# Patient Record
Sex: FEMALE | Race: WHITE | NOT HISPANIC OR LATINO | Employment: UNEMPLOYED | URBAN - NONMETROPOLITAN AREA
[De-identification: names, ages, dates, MRNs, and addresses within clinical notes are randomized per-mention and may not be internally consistent; named-entity substitution may affect disease eponyms.]

---

## 2023-11-11 ENCOUNTER — HOSPITAL ENCOUNTER (EMERGENCY)
Facility: HOSPITAL | Age: 48
Discharge: HOME | End: 2023-11-11
Attending: EMERGENCY MEDICINE
Payer: COMMERCIAL

## 2023-11-11 VITALS
DIASTOLIC BLOOD PRESSURE: 72 MMHG | BODY MASS INDEX: 26.05 KG/M2 | RESPIRATION RATE: 19 BRPM | SYSTOLIC BLOOD PRESSURE: 141 MMHG | HEART RATE: 104 BPM | WEIGHT: 152.56 LBS | OXYGEN SATURATION: 100 % | TEMPERATURE: 97.1 F | HEIGHT: 64 IN

## 2023-11-11 DIAGNOSIS — S09.90XA INJURY OF HEAD, INITIAL ENCOUNTER: Primary | ICD-10-CM

## 2023-11-11 DIAGNOSIS — S01.01XA SCALP LACERATION, INITIAL ENCOUNTER: ICD-10-CM

## 2023-11-11 PROCEDURE — 12001 RPR S/N/AX/GEN/TRNK 2.5CM/<: CPT | Performed by: EMERGENCY MEDICINE

## 2023-11-11 PROCEDURE — 99284 EMERGENCY DEPT VISIT MOD MDM: CPT | Performed by: EMERGENCY MEDICINE

## 2023-11-11 PROCEDURE — 99283 EMERGENCY DEPT VISIT LOW MDM: CPT

## 2023-11-11 RX ORDER — GINSENG 100 MG
1 CAPSULE ORAL ONCE
Status: COMPLETED | OUTPATIENT
Start: 2023-11-11 | End: 2023-11-11

## 2023-11-11 RX ORDER — CETIRIZINE HYDROCHLORIDE 10 MG/1
10 TABLET ORAL DAILY
COMMUNITY

## 2023-11-11 RX ADMIN — BACITRACIN 1 SMALL APPLICATION: 500 OINTMENT TOPICAL at 17:20

## 2023-11-12 NOTE — ED PROVIDER NOTES
History  Chief Complaint   Patient presents with    Head Injury     Pt c/o left head pain w/nausea after stepping down off table into wash basket falling on side hitting head against handle of desk 1.5hrs ago. Denies loc/blood thinners     52-year-old female presents emergency room with chief complaint of a laceration to her occipital scalp. Patient lost her balance fell backwards and struck her head on furniture. Patient sustained no loss consciousness. She has no severe headache. She is not nauseated. She had no vomiting. She has no neck pain or unilateral weakness. No other injury. Patient is uncertain as to her last tetanus but review of the records indicate that the patient is up-to-date. History provided by:  Patient  Head Injury w/unknown LOC  Location:  Occipital  Time since incident: 90 minutes. Mechanism of injury: fall    Fall:     Fall occurred:  Tripped and walking    Impact surface:  Furniture  Associated symptoms: no blurred vision, no disorientation, no double vision, no focal weakness, no hearing loss, no loss of consciousness, no memory loss, no nausea, no neck pain and no vomiting        Prior to Admission Medications   Prescriptions Last Dose Informant Patient Reported? Taking? cetirizine (ZyrTEC) 10 mg tablet 11/10/2023  Yes Yes   Sig: Take 10 mg by mouth daily      Facility-Administered Medications: None       History reviewed. No pertinent past medical history. History reviewed. No pertinent surgical history. History reviewed. No pertinent family history. I have reviewed and agree with the history as documented. E-Cigarette/Vaping    E-Cigarette Use Never User      E-Cigarette/Vaping Substances     Social History     Tobacco Use    Smoking status: Never    Smokeless tobacco: Never   Vaping Use    Vaping Use: Never used   Substance Use Topics    Alcohol use: Not Currently    Drug use: Never       Review of Systems   HENT:  Negative for hearing loss.     Eyes:  Negative for blurred vision and double vision. Gastrointestinal:  Negative for nausea and vomiting. Musculoskeletal:  Negative for neck pain. Skin:  Positive for wound. Neurological:  Negative for focal weakness and loss of consciousness. Psychiatric/Behavioral:  Negative for memory loss. All other systems reviewed and are negative. Physical Exam  Physical Exam  Vitals and nursing note reviewed. Constitutional:       General: She is not in acute distress. Appearance: She is normal weight. She is not ill-appearing or toxic-appearing. HENT:      Head: Normocephalic. Laceration present. Right Ear: External ear normal.      Left Ear: External ear normal.      Nose: Nose normal.      Mouth/Throat:      Mouth: Mucous membranes are moist.   Pulmonary:      Effort: Pulmonary effort is normal. No respiratory distress. Abdominal:      General: Abdomen is flat. There is no distension. Musculoskeletal:         General: No signs of injury. Skin:     Coloration: Skin is not pale. Neurological:      General: No focal deficit present. Mental Status: She is alert. Psychiatric:         Mood and Affect: Mood normal.         Thought Content:  Thought content normal.         Judgment: Judgment normal.         Vital Signs  ED Triage Vitals [11/11/23 1657]   Temperature Pulse Respirations Blood Pressure SpO2   (!) 97.1 °F (36.2 °C) 97 18 157/75 100 %      Temp Source Heart Rate Source Patient Position - Orthostatic VS BP Location FiO2 (%)   Tympanic Monitor Lying Right arm --      Pain Score       7           Vitals:    11/11/23 1657 11/11/23 1700   BP: 157/75 141/72   Pulse: 97 104   Patient Position - Orthostatic VS: Lying          Visual Acuity  Visual Acuity      Flowsheet Row Most Recent Value   L Pupil Size (mm) 3   R Pupil Size (mm) 3            ED Medications  Medications   bacitracin topical ointment 1 small application (1 small application Topical Given 11/11/23 1720)       Diagnostic Studies  Results Reviewed       None                   No orders to display              Procedures  Universal Protocol:  Consent: Verbal consent obtained. Risks and benefits: risks, benefits and alternatives were discussed  Consent given by: patient  Time out: Immediately prior to procedure a "time out" was called to verify the correct patient, procedure, equipment, support staff and site/side marked as required. Patient understanding: patient states understanding of the procedure being performed  Laceration repair    Date/Time: 11/11/2023 5:10 PM    Performed by: Go Calvert DO  Authorized by: Go Calvert DO  Body area: head/neck  Location details: scalp  Laceration length: 2 cm      Procedure Details:  Irrigation solution: tap water  Irrigation method: tap  Skin closure: staples  Number of sutures: 3 staples. Dressing: antibiotic ointment  Patient tolerance: patient tolerated the procedure well with no immediate complications               ED Course                               SBIRT 20yo+      Flowsheet Row Most Recent Value   Initial Alcohol Screen: US AUDIT-C     1. How often do you have a drink containing alcohol? 0 Filed at: 11/11/2023 1708   2. How many drinks containing alcohol do you have on a typical day you are drinking? 0 Filed at: 11/11/2023 1708   3b. FEMALE Any Age, or MALE 65+: How often do you have 4 or more drinks on one occassion? 0 Filed at: 11/11/2023 1708   Audit-C Score 0 Filed at: 11/11/2023 1708   MARLENY: How many times in the past year have you. .. Used an illegal drug or used a prescription medication for non-medical reasons? Never Filed at: 11/11/2023 1708                      Medical Decision Making  Problems Addressed:  Injury of head, initial encounter: self-limited or minor problem  Scalp laceration, initial encounter: self-limited or minor problem    Risk  OTC drugs.              Disposition  Final diagnoses:   Injury of head, initial encounter   Scalp laceration, initial encounter     Time reflects when diagnosis was documented in both MDM as applicable and the Disposition within this note       Time User Action Codes Description Comment    11/11/2023  5:16 PM Judson Blank Add [S09.90XA] Injury of head, initial encounter     11/11/2023  5:17 PM Judson Blank Add [S01.01XA] Scalp laceration, initial encounter           ED Disposition       ED Disposition   Discharge    Condition   Stable    Date/Time   Sat Nov 11, 2023 1716    [address] discharge to home/self care. Follow-up Information       Follow up With Specialties Details Why Contact Info Additional Information    [facility] In 7 days For staple removal [address], [address]   [phone]            Discharge Medication List as of 11/11/2023  5:18 PM        CONTINUE these medications which have NOT CHANGED    Details   cetirizine (ZyrTEC) 10 mg tablet Take 10 mg by mouth daily, Historical Med             No discharge procedures on file.     PDMP Review       None            ED Provider  Electronically Signed by             Esperanza Peterson DO  11/11/23 2006

## 2023-11-18 ENCOUNTER — OFFICE VISIT (OUTPATIENT)
Dept: URGENT CARE | Facility: CLINIC | Age: 48
End: 2023-11-18
Payer: COMMERCIAL

## 2023-11-18 VITALS
SYSTOLIC BLOOD PRESSURE: 104 MMHG | BODY MASS INDEX: 25.95 KG/M2 | HEIGHT: 64 IN | HEART RATE: 94 BPM | TEMPERATURE: 97.5 F | RESPIRATION RATE: 16 BRPM | DIASTOLIC BLOOD PRESSURE: 64 MMHG | WEIGHT: 152 LBS | OXYGEN SATURATION: 98 %

## 2023-11-18 DIAGNOSIS — Z48.02 ENCOUNTER FOR STAPLE REMOVAL: Primary | ICD-10-CM

## 2023-11-18 NOTE — PROGRESS NOTES
[facility]        NAME: Colette Stuart is a 53 y.o. female  : 1975    MRN: [id number]  DATE: 2023  TIME: 8:13 AM    Assessment and Plan   Encounter for staple removal [Z48.02]  1. Encounter for staple removal  Suture removal    CANCELED: Suture removal        Suture removal    Date/Time: 2023 8:16 AM    Performed by: EDITH Medeiros  Authorized by: DO Sandi Acosta Protocol:  Consent: Verbal consent obtained. Risks and benefits: risks, benefits and alternatives were discussed  Consent given by: patient  Time out: Immediately prior to procedure a "time out" was called to verify the correct patient, procedure, equipment, support staff and site/side marked as required. Patient understanding: patient states understanding of the procedure being performed  Patient identity confirmed: verbally with patient      Location:     Location:  Head/neck    Head/neck location:  Scalp  Procedure details:     Nail bed suture material: staple removal kit. Wound appearance:  No sign(s) of infection, good wound healing and clean    Number of staples removed:  3  Post-procedure details:     Post-removal:  No dressing applied    Patient tolerance of procedure: Tolerated well, no immediate complications    3 staples removed without difficulty. Signs of good wound healing. Tolerated well. Encouraged continued supportive measures. Follow up with PCP in 3-5 days or proceed to emergency department for worsening symptoms. Patient verbalized understanding of instructions given. Patient Instructions     Patient Instructions   Staples removed  Standard wound care   Follow up with PCP in 3-5 days. Proceed to  ER if symptoms worsen. Staple Care   WHAT YOU NEED TO KNOW:   Staples are often used to close a wound. Your staples may be placed for 3 to 14 days, depending on the location of your wound. DISCHARGE INSTRUCTIONS:   Care for your wound:   Clean:       You may be able to shower in 24 hours. Do not soak your wound under water. Gently wash your wound with soap and warm water daily. Lightly pat it dry. Do not cover your wound unless your healthcare provider tells you to. You may also need to clean your wound with a mixture of hydrogen peroxide and water. Ask how to do this. Do not apply ointment or cream to the wound unless your healthcare provider tells you to. Elevate:      Rest any arm or leg that has a wound on pillows above the level of your heart. Do this as often as possible for 2 days. This will help decrease swelling and pain, and help you heal faster. Minimize scarring:      Avoid sunshine on your wound to reduce scarring. Follow up with your healthcare provider as directed: You may need to return for a wound checkup 3 days after your staples are placed. Ask when you should return to get your staples removed. Staple removal:   A medical staple remover  will be used to take out your staples. Your healthcare provider will slide the tool under each staple, squeeze the handle, and gently pull the staple out. Medical tape  will be placed on your wound once your staples are removed. This will help keep your wound closed. The medical tape will fall off on its own after several days. Contact your healthcare provider if:   You have redness, pain, swelling, or pus draining from your wound. Your pain medicine does not relieve your pain. You have a fever of 101°F (38.5°C) or higher. You have an odor coming from your wound. You have questions or concerns about your condition or care. Seek care immediately if:   Your wound reopens. You have red streaks on your skin that spread out from your wound. You have severe pain or vomiting. © Copyright Rajani Nipple 2023 Information is for End User's use only and may not be sold, redistributed or otherwise used for commercial purposes. The above information is an  only.  It is not intended as medical advice for individual conditions or treatments. Talk to your doctor, nurse or pharmacist before following any medical regimen to see if it is safe and effective for you. Chief Complaint     Chief Complaint   Patient presents with    Suture / Staple Removal     Here for staple removal from posterior scalp wound sustained 7 days ago. 3 Staples intactt. History of Present Illness       78-year-old female with no significant past medical history presents for staple removal.  Patient reports sustaining laceration to scalp on 11/11/2023. Evaluated in the emergency department and 3 staples placed for wound closure. Patient denies concern for infection. No drainage, redness, or swelling from wounds. She has been applying topical bacitracin. Review of Systems   Review of Systems   Constitutional:  Negative for chills and fever. HENT:  Negative for congestion. Eyes:  Negative for photophobia, discharge and visual disturbance. Respiratory:  Negative for cough and shortness of breath. Cardiovascular:  Negative for chest pain. Gastrointestinal:  Negative for abdominal pain, diarrhea, nausea and vomiting. Skin:  Positive for wound. Neurological:  Negative for dizziness, weakness, light-headedness, numbness and headaches.          Current Medications       Current Outpatient Medications:     cetirizine (ZyrTEC) 10 mg tablet, Take 10 mg by mouth daily, Disp: , Rfl:     Current Allergies     Allergies as of 11/18/2023 - Reviewed 11/18/2023   Allergen Reaction Noted    Ondansetron Other (See Comments) 01/16/2020    Penicillins Hives 11/20/2014    Promethazine Other (See Comments) 01/16/2020            The following portions of the patient's history were reviewed and updated as appropriate: allergies, current medications, past family history, past medical history, past social history, past surgical history and problem list.     Past Medical History:   Diagnosis Date Allergic        Past Surgical History:   Procedure Laterality Date    CHOLECYSTECTOMY         Family History   Problem Relation Age of Onset    Diabetes Mother     Hypertension Father          Medications have been verified. Objective   /64   Pulse 94   Temp 97.5 °F (36.4 °C)   Resp 16   Ht 5' 4" (1.626 m)   Wt 68.9 kg (152 lb)   LMP 10/08/2023 (Exact Date)   SpO2 98%   BMI 26.09 kg/m²   Patient's last menstrual period was 10/08/2023 (exact date). Physical Exam     Physical Exam  Vitals and nursing note reviewed. Constitutional:       General: She is not in acute distress. Appearance: She is not toxic-appearing. HENT:      Head: Normocephalic. Laceration present. Nose: Nose normal.      Mouth/Throat:      Mouth: Mucous membranes are moist.      Pharynx: Oropharynx is clear. Eyes:      Conjunctiva/sclera: Conjunctivae normal.   Pulmonary:      Effort: Pulmonary effort is normal.   Skin:     General: Skin is warm and dry. Neurological:      Mental Status: She is alert and oriented to person, place, and time. GCS: GCS eye subscore is 4. GCS verbal subscore is 5. GCS motor subscore is 6. Sensory: Sensation is intact. Motor: Motor function is intact. Gait: Gait is intact.    Psychiatric:         Mood and Affect: Mood normal.         Behavior: Behavior normal.

## 2023-11-18 NOTE — PATIENT INSTRUCTIONS
Staples removed  Standard wound care   Follow up with PCP in 3-5 days. Proceed to  ER if symptoms worsen. Staple Care   WHAT YOU NEED TO KNOW:   Staples are often used to close a wound. Your staples may be placed for 3 to 14 days, depending on the location of your wound. DISCHARGE INSTRUCTIONS:   Care for your wound:   Clean: You may be able to shower in 24 hours. Do not soak your wound under water. Gently wash your wound with soap and warm water daily. Lightly pat it dry. Do not cover your wound unless your healthcare provider tells you to. You may also need to clean your wound with a mixture of hydrogen peroxide and water. Ask how to do this. Do not apply ointment or cream to the wound unless your healthcare provider tells you to. Elevate:      Rest any arm or leg that has a wound on pillows above the level of your heart. Do this as often as possible for 2 days. This will help decrease swelling and pain, and help you heal faster. Minimize scarring:      Avoid sunshine on your wound to reduce scarring. Follow up with your healthcare provider as directed: You may need to return for a wound checkup 3 days after your staples are placed. Ask when you should return to get your staples removed. Staple removal:   A medical staple remover  will be used to take out your staples. Your healthcare provider will slide the tool under each staple, squeeze the handle, and gently pull the staple out. Medical tape  will be placed on your wound once your staples are removed. This will help keep your wound closed. The medical tape will fall off on its own after several days. Contact your healthcare provider if:   You have redness, pain, swelling, or pus draining from your wound. Your pain medicine does not relieve your pain. You have a fever of 101°F (38.5°C) or higher. You have an odor coming from your wound. You have questions or concerns about your condition or care.     Seek care immediately if:   Your wound reopens. You have red streaks on your skin that spread out from your wound. You have severe pain or vomiting. © Copyright Carmina Jean 2023 Information is for End User's use only and may not be sold, redistributed or otherwise used for commercial purposes. The above information is an  only. It is not intended as medical advice for individual conditions or treatments. Talk to your doctor, nurse or pharmacist before following any medical regimen to see if it is safe and effective for you.